# Patient Record
Sex: FEMALE | Race: WHITE | Employment: STUDENT | URBAN - METROPOLITAN AREA
[De-identification: names, ages, dates, MRNs, and addresses within clinical notes are randomized per-mention and may not be internally consistent; named-entity substitution may affect disease eponyms.]

---

## 2019-09-21 ENCOUNTER — OFFICE VISIT (OUTPATIENT)
Dept: URGENT CARE | Facility: URGENT CARE | Age: 24
End: 2019-09-21
Payer: COMMERCIAL

## 2019-09-21 VITALS
WEIGHT: 137 LBS | DIASTOLIC BLOOD PRESSURE: 73 MMHG | TEMPERATURE: 99.2 F | SYSTOLIC BLOOD PRESSURE: 113 MMHG | OXYGEN SATURATION: 99 % | HEART RATE: 91 BPM

## 2019-09-21 DIAGNOSIS — R07.0 THROAT PAIN: Primary | ICD-10-CM

## 2019-09-21 DIAGNOSIS — R25.2 TRISMUS: ICD-10-CM

## 2019-09-21 PROCEDURE — 99213 OFFICE O/P EST LOW 20 MIN: CPT | Performed by: FAMILY MEDICINE

## 2019-09-21 NOTE — PROGRESS NOTES
SUBJECTIVE:   Irais Calvo is a 23 year old female presenting with   Chief Complaint   Patient presents with     Urgent Care     Pt in clinic to have eval for sore throat.  Pt states she had negative strep and mono yesterday.     Throat Pain     Symptoms started yesterday with sore throat.  Seen at [facility] and had negative strep and mono.  Started on cefdinir and steroid for tonsillitis.  She returns today with worsening symptoms - severe sore throat pain, hurts to swallow and spitting out her saliva, although she can swallow with pain.  Voice is muffled today and can't fully open her mouth today.  No fevers.   She had a recent right sided peritonsillar abscess, drained by ENT on 8/18 she thinks/recalls and then was sent to [facility] for observation.  Had been well until this week.       OBJECTIVE  /73   Pulse 91   Temp 99.2  F (37.3  C) (Oral)   Wt 62.1 kg (137 lb)   SpO2 99%   GENERAL:  Awake, alert and interactive. No acute distress.  HEENT:   NC/AT, EOMI, clear conjunctiva.  Nose clear.  Oropharynx with BL and roughly equal tonsillar enlargement.  Ulceration noted on left tonsil.  Markedly erythematous.  TM's and EAC's benign.  No facial erythema or swelling noted.  Cannot open mouth more than a few finger widths.  NECK: supple and free of adenopathy  CHEST:  Lungs are clear, no rhonchi, wheezing or rales. Normal symmetric air entry throughout both lung fields.   HEART:  S1 and S2 normal, no murmurs. Regular rate and rhythm.      ASSESSMENT/PLAN    ICD-10-CM    1. Throat pain R07.0    2. Trismus R25.2      With worsening symptoms despite steroids and abx started yesterday, and recent h/o peritonsillar abscess, sent on to ER for more comprehensive evaluation today.   I spoke with Dr Juan (sp?) who is expecting her shortly by private vehicle.